# Patient Record
Sex: MALE | Race: OTHER | HISPANIC OR LATINO | URBAN - METROPOLITAN AREA
[De-identification: names, ages, dates, MRNs, and addresses within clinical notes are randomized per-mention and may not be internally consistent; named-entity substitution may affect disease eponyms.]

---

## 2024-03-07 ENCOUNTER — EMERGENCY (EMERGENCY)
Facility: HOSPITAL | Age: 34
LOS: 1 days | Discharge: HOME | End: 2024-03-07
Attending: STUDENT IN AN ORGANIZED HEALTH CARE EDUCATION/TRAINING PROGRAM
Payer: COMMERCIAL

## 2024-03-07 VITALS
RESPIRATION RATE: 17 BRPM | TEMPERATURE: 98 F | SYSTOLIC BLOOD PRESSURE: 117 MMHG | OXYGEN SATURATION: 99 % | DIASTOLIC BLOOD PRESSURE: 72 MMHG | HEART RATE: 87 BPM

## 2024-03-07 VITALS
HEART RATE: 127 BPM | DIASTOLIC BLOOD PRESSURE: 90 MMHG | SYSTOLIC BLOOD PRESSURE: 127 MMHG | TEMPERATURE: 98 F | OXYGEN SATURATION: 98 % | RESPIRATION RATE: 20 BRPM | WEIGHT: 190.04 LBS | HEIGHT: 67 IN

## 2024-03-07 PROCEDURE — 70450 CT HEAD/BRAIN W/O DYE: CPT | Mod: 26,MC

## 2024-03-07 PROCEDURE — 70450 CT HEAD/BRAIN W/O DYE: CPT | Mod: MC

## 2024-03-07 PROCEDURE — 73562 X-RAY EXAM OF KNEE 3: CPT

## 2024-03-07 PROCEDURE — 73562 X-RAY EXAM OF KNEE 3: CPT | Mod: 26,RT

## 2024-03-07 PROCEDURE — 99284 EMERGENCY DEPT VISIT MOD MDM: CPT | Mod: 25

## 2024-03-07 PROCEDURE — 99284 EMERGENCY DEPT VISIT MOD MDM: CPT

## 2024-03-07 RX ORDER — ACETAMINOPHEN 500 MG
975 TABLET ORAL ONCE
Refills: 0 | Status: COMPLETED | OUTPATIENT
Start: 2024-03-07 | End: 2024-03-07

## 2024-03-07 RX ORDER — LIDOCAINE 4 G/100G
1 CREAM TOPICAL ONCE
Refills: 0 | Status: COMPLETED | OUTPATIENT
Start: 2024-03-07 | End: 2024-03-07

## 2024-03-07 RX ORDER — IBUPROFEN 200 MG
600 TABLET ORAL ONCE
Refills: 0 | Status: COMPLETED | OUTPATIENT
Start: 2024-03-07 | End: 2024-03-07

## 2024-03-07 RX ADMIN — Medication 975 MILLIGRAM(S): at 10:58

## 2024-03-07 RX ADMIN — LIDOCAINE 1 PATCH: 4 CREAM TOPICAL at 10:58

## 2024-03-07 RX ADMIN — Medication 600 MILLIGRAM(S): at 10:58

## 2024-03-07 NOTE — ED PROVIDER NOTE - PHYSICAL EXAMINATION
CONSTITUTIONAL: Well appearing and in no apparent distress.  ENT: Airway patent, moist mucous membranes. +Small ?perforation to left ear with trace streak of blood on TM. No active bleeding or drainage from ear. No canal erythema. No TTP. No facial TTP.  EYES: Pupils equal, round and reactive to light. EOMI. Conjunctiva normal appearing.   NECK: No midline cspine TTP. FROM of neck.   CARDIAC: Normal rate, regular rhythm.  Heart sounds S1, S2.    RESPIRATORY: Breath sounds clear and equal bilaterally.   GASTROINTESTINAL: Abdomen soft, non-tender, not distended.  MUSCULOSKELETAL: Spine appears normal. No midline TTP. Mild TTP over right knee, medial>lateral. No swelling or effusions noted. FROM of knee. Ambulating with antalgic gait.   NEUROLOGICAL: Alert and oriented x3, no focal deficits, grossly normal.
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
## 2024-03-07 NOTE — ED PROVIDER NOTE - NS ED ATTENDING STATEMENT MOD
denies pain/discomfort This was a shared visit with the CORY. I reviewed and verified the documentation.

## 2024-03-07 NOTE — ED PROVIDER NOTE - PATIENT PORTAL LINK FT
You can access the FollowMyHealth Patient Portal offered by [facility] by registering at the following website: [email or web address]. By joining [facility]’s FollowMyHealth portal, you will also be able to view your health information using other applications (apps) compatible with our system.

## 2024-03-07 NOTE — ED ADULT NURSE NOTE - CHPI ED NUR SYMPTOMS NEG
no acting out behaviors/no bruising/no crying/no decreased eating/drinking/no loss of consciousness/no neck tenderness

## 2024-03-07 NOTE — ED PROVIDER NOTE - NSFOLLOWUPINSTRUCTIONS_ED_ALL_ED_FT
Please make sure to follow up with your primary care doctor within 1-2 days and with the ENT specialist.   Return to the ER as discussed if you develop any new or worsening symptoms.       Our ED referrals coordinator will call you with appointment details to see the specialist, if you do not hear from anyone please call [phone] for additional assistance.     You may take Tylenol 1000mg and Ibuprofen 400mg every 6 hours as needed for pain. Take Ibuprofen with food. Asegúrese de realizar un seguimiento con benedict médico de atención primaria dentro de 1 a 2 días y con el otorrinolaringólogo.  Regrese a la tamela de emergencias joanna se indicó si desarrolla algún síntoma nuevo o que empeora.    Nuestro coordinador de referencias al servicio de urgencias lo llamará con los detalles de la sruthi para susie al especialista; si no recibe noticias de nadie, llame al [phone] para obtener ayuda adicional.    Puede kat Tylenol 1000 mg e ibuprofeno 400 mg cada 6 horas según sea necesario para el dolor. [place] ibuprofeno con la comida.Asegúrese de realizar un seguimiento con benedict médico de atención primaria dentro de 1 a 2 días y con el otorrinolaringólogo.  Regrese a la tamela de emergencias joanna se indicó si desarrolla algún síntoma nuevo o que empeora.      Un tímpano roto (perforado) generalmente se lyndon por sí solo en unas semanas. En algunos casos, la curación lleva meses. Hasta que benedict proveedor le diga que beneditc oído ha sanado, protéjalo de la siguiente manera:    Mantener el oído seco. Coloque un tapón de silicona resistente al agua o kim bolita de algodón recubierta con vaselina en el oído cuando se duche o bañe.  Abstenerse de limpiar los oídos. Jerry tiempo al tímpano para que sane por completo.  Evitar sonarse la nariz. La presión creada al sonarse la nariz puede dañar el tímpano en proceso de curación.    _____________________________________________________________________________________    Please make sure to follow up with your primary care doctor within 1-2 days and with the ENT specialist.   Return to the ER as discussed if you develop any new or worsening symptoms.       Our ED referrals coordinator will call you with appointment details to see the specialist, if you do not hear from anyone please call [phone] for additional assistance.     You may take Tylenol 1000mg and Ibuprofen 400mg every 6 hours as needed for pain. Take Ibuprofen with food.      A ruptured (perforated) eardrum usually heals on its own within a few weeks. In some cases, healing takes months. Until your provider tells you that your ear is healed, protect it by doing the following:    Keep your ear dry. Place a waterproof silicone earplug or cotton ball coated with Vaseline in your ear when you shower or bathe.  Refrain from cleaning the ears. Give the eardrum time to fully heal.  Avoid blowing your nose. The pressure created by blowing your nose can damage the healing eardrum.
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
## 2024-03-07 NOTE — ED PROVIDER NOTE - PROGRESS NOTE DETAILS
Pt updated on CT results. Advised f/u with ENT. Referral placed in chart. Perforated TM precautions reviewed with pt. Tylenol/Motrin for pain control. Patient stable for discharge.  Follow up instructions given, return to ED precautions reviewed. Importance of follow up emphasized, patient verbalized understanding.  All questions answered. Ruth Ann Terry PA-C